# Patient Record
(demographics unavailable — no encounter records)

---

## 2017-08-17 NOTE — PCVCIMAG
--------------- APPROVED REPORT --------------





Study performed:  08/17/2017 15:40:06



EXAM: Comprehensive 2D, Doppler, and color-flow 

Echocardiogram

Patient Location: Echo lab

Status:  routine



BSA:         1.32

HR: 64 bpmBP:          148/84 mmHg

Rhythm: NSR



Other Information 

Study Quality: Technically Difficult



Risk Factors: 

Cardiac Risk Factors:  CAD,STENT



Indications

Dyspnea 

CAD

Fatigue 

S/P STENT



2D Dimensions

LVEF(%):  75.73 (&gt;50%)

IVSd:  7.50 (7-11mm)LVOT Diam:  16.73 (18-24mm) 

LVDd:  35.68 mm

PWd:  6.91 (7-11mm)Ascending Ao:  30.42 (22-36mm)

LVDs:  20.12 (25-40mm)

Left Atrium:  27.56 (27-40mm)

Aortic Root:  21.85 mm

LV Single Plane 4CH:  75.14 %

LV Single Plane 2CH:  67.85 %Downey's LVEF:  71.50 %

Biplane EF:  71.3 %



Volumes

Left Atrial Volume (Systole)

Single Plane 4CH:  16.31 mLSingle Plane 2CH:  20.92 mL

Biplane LA Volume:  19.00 mLLA ESV Index:  15.00 mL/m2



Aortic Valve

AoV Peak Caleb.:  1.39 m/s

AO Peak Gr.:  7.70 mmHgLVOT Max PG:  3.76 mmHg

LVOT Max V:  0.97 m/s

JACOB Vmax: 1.53 cm2



Mitral Valve

E/A Ratio:  1.0

MV Decel. Time:  281.59 ms

MV E Max Caleb.:  0.71 m/s

MV A Caleb.:  0.71 m/s

IVRT:  89.97 ms



TDI

E/Lateral E':  8.88E/Medial E':  10.14

Medial E' Caleb.:  0.07 m/s

Lateral E' Caleb.:  0.08 m/s



Pulmonary Valve

PV Peak Caleb.:  1.06 m/sPV Peak Gr.:  4.49 mmHg



Pulmonary Vein

P Vein S:    0.40 m/sP Vein A:  0.32 m/s

P Vein D:   0.35 m/sP Vein A Dur.:  86.5 msec

P Vein S/D Ratio:  1.14



Tricuspid Valve

TR Peak Caleb.:  1.35 m/s

TR Peak Gr.:  7.32 mmHg

TV Vmax:  0.54 m/sPA Pressure:  14.00 mmHg



Left Ventricle

The left ventricle is normal size. There is normal LV segmental wall 

motion. There is normal left ventricular wall thickness. Left 

ventricular systolic function is normal. The left ventricular 

ejection fraction is within the normal range. LVEF is 65-70%. The 

left ventricular diastolic function is normal.



Right Ventricle

Right ventricle is grossly normal in size. RV free wall appears 

mildly thickened. The right ventricular systolic function is 

normal.



Atria

The left atrium size is normal. The right atrium size is 

normal.



Aortic Valve

The aortic valve is normal in structure. No aortic regurgitation is 

present. There is no aortic valvular stenosis.



Mitral Valve

The mitral valve is normal in structure. There is no mitral valve 

regurgitation noted. No evidence of mitral valve stenosis.



Tricuspid Valve

The tricuspid valve is normal in structure. Trace to mild tricuspid 

regurgitation with a PA pressure of 14.



Pulmonic Valve

The pulmonary valve is normal in structure. Trace to mild pulmonic 

regurgitation.



Great Vessels

The aortic root is normal in size. IVC is normal in size and 

collapses with &gt;50% inspiration



Pericardium

There is no pericardial effusion. Image quality is not adequatee to 

rule out ascites or a pleural effeusion



&lt;Conclusion&gt;

The left ventricle is normal size.

There is normal LV segmental wall motion.

The left ventricular diastolic function is normal.

The aortic valve is normal in structure.

The mitral valve is normal in structure.

The tricuspid valve is normal in structure.

Trace to mild tricuspid regurgitation with a PA pressure of 14.

The pulmonary valve is normal in structure.

Trace to mild pulmonic regurgitation.

Image quality is not adequatee to rule out ascites or a pleural 

effeusion